# Patient Record
Sex: FEMALE | Race: WHITE
[De-identification: names, ages, dates, MRNs, and addresses within clinical notes are randomized per-mention and may not be internally consistent; named-entity substitution may affect disease eponyms.]

---

## 2019-09-20 ENCOUNTER — APPOINTMENT (OUTPATIENT)
Dept: CT IMAGING | Facility: CLINIC | Age: 39
End: 2019-09-20
Payer: COMMERCIAL

## 2019-09-20 ENCOUNTER — APPOINTMENT (OUTPATIENT)
Dept: CT IMAGING | Facility: CLINIC | Age: 39
End: 2019-09-20

## 2019-09-20 PROCEDURE — 71275 CT ANGIOGRAPHY CHEST: CPT

## 2019-09-20 PROCEDURE — Q9967F: CUSTOM

## 2019-09-24 PROBLEM — Z00.00 ENCOUNTER FOR PREVENTIVE HEALTH EXAMINATION: Status: ACTIVE | Noted: 2019-09-24

## 2019-10-01 ENCOUNTER — APPOINTMENT (OUTPATIENT)
Dept: CARDIOLOGY | Facility: CLINIC | Age: 39
End: 2019-10-01
Payer: COMMERCIAL

## 2019-10-01 ENCOUNTER — NON-APPOINTMENT (OUTPATIENT)
Age: 39
End: 2019-10-01

## 2019-10-01 VITALS
DIASTOLIC BLOOD PRESSURE: 74 MMHG | OXYGEN SATURATION: 98 % | BODY MASS INDEX: 25.52 KG/M2 | HEIGHT: 60 IN | HEART RATE: 75 BPM | WEIGHT: 130 LBS | SYSTOLIC BLOOD PRESSURE: 110 MMHG

## 2019-10-01 DIAGNOSIS — Z86.59 PERSONAL HISTORY OF OTHER MENTAL AND BEHAVIORAL DISORDERS: ICD-10-CM

## 2019-10-01 DIAGNOSIS — Z82.49 FAMILY HISTORY OF ISCHEMIC HEART DISEASE AND OTHER DISEASES OF THE CIRCULATORY SYSTEM: ICD-10-CM

## 2019-10-01 DIAGNOSIS — Z78.9 OTHER SPECIFIED HEALTH STATUS: ICD-10-CM

## 2019-10-01 PROCEDURE — 93000 ELECTROCARDIOGRAM COMPLETE: CPT

## 2019-10-01 PROCEDURE — 99205 OFFICE O/P NEW HI 60 MIN: CPT

## 2019-10-01 RX ORDER — ALPRAZOLAM 0.25 MG/1
0.25 TABLET ORAL
Refills: 0 | Status: ACTIVE | COMMUNITY

## 2019-10-01 NOTE — ASSESSMENT
[FreeTextEntry1] : CP/SOB:  Echocardiogram indicated for assessment of pericardium and LV fxn.  Stress echo indicated to assess for ischemia.\par \par Birth Control:  Overall have recommended d/c of all birth control.  Pt. will review w GYN who put in device.

## 2019-10-01 NOTE — HISTORY OF PRESENT ILLNESS
[FreeTextEntry1] : Several weeks ago the patient had a new birth control\par  About that time she developed dyspnea. Patient is SOB with minimal exertion. It is very unusual for her. She previously exercised without any exertional dyspnea. Almost simultaneously she developed substernal chest heaviness. Sometimes occurs with exertion. Sometimes occurs with deep breath. The patient underwent CT of the chest. It reveals prominent pulmonary artery consistent with pulmonary hypertension. There was no pulmonary embolisms. There has been no prolonged rest chest discomfort.  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

## 2019-10-01 NOTE — PHYSICAL EXAM
[General Appearance - Well Developed] : well developed [Normal Appearance] : normal appearance [Well Groomed] : well groomed [General Appearance - Well Nourished] : well nourished [No Deformities] : no deformities [General Appearance - In No Acute Distress] : no acute distress [Normal Conjunctiva] : the conjunctiva exhibited no abnormalities [Eyelids - No Xanthelasma] : the eyelids demonstrated no xanthelasmas [Normal Oral Mucosa] : normal oral mucosa [No Oral Pallor] : no oral pallor [No Oral Cyanosis] : no oral cyanosis [Normal Jugular Venous A Waves Present] : normal jugular venous A waves present [Normal Jugular Venous V Waves Present] : normal jugular venous V waves present [No Jugular Venous Mclaughlin A Waves] : no jugular venous mclaughlin A waves [Heart Rate And Rhythm] : heart rate and rhythm were normal [Heart Sounds] : normal S1 and S2 [Murmurs] : no murmurs present [Respiration, Rhythm And Depth] : normal respiratory rhythm and effort [Exaggerated Use Of Accessory Muscles For Inspiration] : no accessory muscle use [Auscultation Breath Sounds / Voice Sounds] : lungs were clear to auscultation bilaterally [Abdomen Soft] : soft [Abdomen Tenderness] : non-tender [Abdomen Mass (___ Cm)] : no abdominal mass palpated [Abnormal Walk] : normal gait [Gait - Sufficient For Exercise Testing] : the gait was sufficient for exercise testing [Nail Clubbing] : no clubbing of the fingernails [Cyanosis, Localized] : no localized cyanosis [Petechial Hemorrhages (___cm)] : no petechial hemorrhages [Skin Color & Pigmentation] : normal skin color and pigmentation [] : no rash [No Venous Stasis] : no venous stasis [Skin Lesions] : no skin lesions [No Skin Ulcers] : no skin ulcer [No Xanthoma] : no  xanthoma was observed [Oriented To Time, Place, And Person] : oriented to person, place, and time [Affect] : the affect was normal [Mood] : the mood was normal [No Anxiety] : not feeling anxious

## 2019-10-14 ENCOUNTER — APPOINTMENT (OUTPATIENT)
Dept: CARDIOLOGY | Facility: CLINIC | Age: 39
End: 2019-10-14
Payer: COMMERCIAL

## 2019-10-14 PROCEDURE — 93306 TTE W/DOPPLER COMPLETE: CPT

## 2019-10-23 ENCOUNTER — APPOINTMENT (OUTPATIENT)
Dept: CARDIOLOGY | Facility: CLINIC | Age: 39
End: 2019-10-23
Payer: COMMERCIAL

## 2019-10-23 PROCEDURE — 93015 CV STRESS TEST SUPVJ I&R: CPT

## 2019-11-13 ENCOUNTER — APPOINTMENT (OUTPATIENT)
Dept: CARDIOLOGY | Facility: CLINIC | Age: 39
End: 2019-11-13
Payer: COMMERCIAL

## 2019-11-13 VITALS
BODY MASS INDEX: 25.13 KG/M2 | WEIGHT: 128 LBS | OXYGEN SATURATION: 98 % | HEIGHT: 60 IN | SYSTOLIC BLOOD PRESSURE: 110 MMHG | HEART RATE: 72 BPM | DIASTOLIC BLOOD PRESSURE: 68 MMHG

## 2019-11-13 DIAGNOSIS — Z78.9 OTHER SPECIFIED HEALTH STATUS: ICD-10-CM

## 2019-11-13 DIAGNOSIS — Z86.59 PERSONAL HISTORY OF OTHER MENTAL AND BEHAVIORAL DISORDERS: ICD-10-CM

## 2019-11-13 DIAGNOSIS — R06.02 SHORTNESS OF BREATH: ICD-10-CM

## 2019-11-13 PROCEDURE — 99213 OFFICE O/P EST LOW 20 MIN: CPT

## 2019-11-13 NOTE — PHYSICAL EXAM
[General Appearance - Well Developed] : well developed [General Appearance - Well Nourished] : well nourished [Well Groomed] : well groomed [Normal Appearance] : normal appearance [No Deformities] : no deformities [General Appearance - In No Acute Distress] : no acute distress [Normal Conjunctiva] : the conjunctiva exhibited no abnormalities [Eyelids - No Xanthelasma] : the eyelids demonstrated no xanthelasmas [Normal Oral Mucosa] : normal oral mucosa [No Oral Cyanosis] : no oral cyanosis [No Oral Pallor] : no oral pallor [Normal Jugular Venous A Waves Present] : normal jugular venous A waves present [Normal Jugular Venous V Waves Present] : normal jugular venous V waves present [No Jugular Venous Mclaughlin A Waves] : no jugular venous mclaughlin A waves [Respiration, Rhythm And Depth] : normal respiratory rhythm and effort [Auscultation Breath Sounds / Voice Sounds] : lungs were clear to auscultation bilaterally [Exaggerated Use Of Accessory Muscles For Inspiration] : no accessory muscle use [Heart Sounds] : normal S1 and S2 [Heart Rate And Rhythm] : heart rate and rhythm were normal [Murmurs] : no murmurs present [Abdomen Soft] : soft [Abdomen Tenderness] : non-tender [Abdomen Mass (___ Cm)] : no abdominal mass palpated [Abnormal Walk] : normal gait [Gait - Sufficient For Exercise Testing] : the gait was sufficient for exercise testing [Nail Clubbing] : no clubbing of the fingernails [Petechial Hemorrhages (___cm)] : no petechial hemorrhages [Cyanosis, Localized] : no localized cyanosis [Skin Color & Pigmentation] : normal skin color and pigmentation [] : no rash [No Venous Stasis] : no venous stasis [Skin Lesions] : no skin lesions [No Skin Ulcers] : no skin ulcer [No Xanthoma] : no  xanthoma was observed [Oriented To Time, Place, And Person] : oriented to person, place, and time [Affect] : the affect was normal [Mood] : the mood was normal [No Anxiety] : not feeling anxious

## 2019-11-13 NOTE — ASSESSMENT
[FreeTextEntry1] : CP/SOB: Chest pain is a reproducible at times, offered CTA coronary testing to further evaluate for ischemia. reviewed risks and benefits of testing, patient declined any further testing at this time, may consider in the future but wishes to wait to see if symptoms decrease with the recent removal of her birth control implant. \par \par Pt. advised pulm f/u for enlarged PA.  \par \par Birth Control: Pt recently d/c implanted birth control. not on any hormonal forms of birth control, only on barrier forms\par \par F/U with our office in 6 months for routine cardiovascular care unless otherwise indicated. \par Discussed red flag symptoms, which would warrant sooner or emergent medical evaluation.\par Any questions and concerns were addressed and resolved.\par \par Sincerely,\par \par Saran Castrejon, NP-C\par Patients history, testing, and plan reviewed with supervising MD: Dr. Jose Kate\par \par

## 2019-11-13 NOTE — HISTORY OF PRESENT ILLNESS
[FreeTextEntry1] : This is a 39 year female with the below PMHx who presents to office for routine cardiovascular care she had been c/o episodes of shortness of breath only on exertion.\par There has been no recent illness or hospitalization.\par \par Several weeks ago the patient had a new implanted Nexplanon birth control (The she reports at today's visit that she had the birth control implanted removed 2 weeks prior). About the time of implantations she started to develop dyspnea. Patients reports continued episodes SOB with minimal exertion but does not interfer with ADLs. she previously exercised without any exertional dyspnea but has exersice recently secondary to MICHAUD.. She also reports wax/wane episodes of chest and back pain at rest, but no shortness of breath at rest .Sometimes the pain is reproduce with deep breath. The patient underwent CT of the chest. It reveals prominent pulmonary artery consistent with pulmonary hypertension,  ECHO finding with RV systolic pressure of 25 . There was no pulmonary embolisms on CT. There has been no prolonged rest chest discomfort.  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

## 2020-05-13 ENCOUNTER — APPOINTMENT (OUTPATIENT)
Dept: CARDIOLOGY | Facility: CLINIC | Age: 40
End: 2020-05-13

## 2021-01-05 ENCOUNTER — TRANSCRIPTION ENCOUNTER (OUTPATIENT)
Age: 41
End: 2021-01-05

## 2022-07-14 ENCOUNTER — APPOINTMENT (OUTPATIENT)
Dept: MAMMOGRAPHY | Facility: CLINIC | Age: 42
End: 2022-07-14

## 2022-07-14 PROCEDURE — 77067 SCR MAMMO BI INCL CAD: CPT

## 2022-07-14 PROCEDURE — 77063 BREAST TOMOSYNTHESIS BI: CPT

## 2022-10-07 ENCOUNTER — EMERGENCY (EMERGENCY)
Facility: HOSPITAL | Age: 42
LOS: 1 days | Discharge: HOME | End: 2022-10-07
Attending: EMERGENCY MEDICINE | Admitting: EMERGENCY MEDICINE
Payer: COMMERCIAL

## 2022-10-07 VITALS
OXYGEN SATURATION: 99 % | HEIGHT: 61 IN | DIASTOLIC BLOOD PRESSURE: 87 MMHG | WEIGHT: 139.99 LBS | HEART RATE: 98 BPM | SYSTOLIC BLOOD PRESSURE: 143 MMHG | RESPIRATION RATE: 18 BRPM | TEMPERATURE: 99 F

## 2022-10-07 PROCEDURE — 73590 X-RAY EXAM OF LOWER LEG: CPT

## 2022-10-07 PROCEDURE — 73080 X-RAY EXAM OF ELBOW: CPT

## 2022-10-07 PROCEDURE — 99285 EMERGENCY DEPT VISIT HI MDM: CPT

## 2022-10-07 PROCEDURE — 72125 CT NECK SPINE W/O DYE: CPT | Mod: MG

## 2022-10-07 PROCEDURE — 73590 X-RAY EXAM OF LOWER LEG: CPT | Mod: 26,RT

## 2022-10-07 PROCEDURE — 99284 EMERGENCY DEPT VISIT MOD MDM: CPT | Mod: 25

## 2022-10-07 PROCEDURE — 73080 X-RAY EXAM OF ELBOW: CPT | Mod: 26,RT

## 2022-10-07 PROCEDURE — 72125 CT NECK SPINE W/O DYE: CPT | Mod: 26,MG

## 2022-10-07 PROCEDURE — G1004: CPT

## 2022-10-07 PROCEDURE — 70450 CT HEAD/BRAIN W/O DYE: CPT | Mod: 26,MG

## 2022-10-07 PROCEDURE — 70450 CT HEAD/BRAIN W/O DYE: CPT | Mod: MG

## 2022-10-07 NOTE — ED PROVIDER NOTE - PHYSICAL EXAMINATION
Pos tend to R paraspinal cervical, R post occiput. No midline spinal tend (c,t,l).   Pos min tend to R elbow, FROM with no pain. FROM R shoulder with no acute pain. no edema.

## 2022-10-07 NOTE — ED PROVIDER NOTE - PROGRESS NOTE DETAILS
All results were explained to patient and a copy of all available results given. At length discussion with patient in regards to the head inj.  Discussed importance of prompt, close medical follow-up.  Discussed importance of avoiding activities where the patient would be at risk for further head injury, for a minimum of 2 weeks.  Discussed head injury precautions and instructions as well.  Patient demonstrates understanding of all instructions.  Dw pt re fall prec, gen trauma prec as well.

## 2022-10-07 NOTE — ED PROVIDER NOTE - OBJECTIVE STATEMENT
42-year-old female with no significant past medical history, currently undergoing IVF not having yet had implantation presents with status post slip and fall on a wet floor today.  Patient slipped on the floor, hitting the back of her head and right elbow on the wall.  Patient also hit the right shin.  No LOC.  Patient complains of right sided neck pain, moderate headache.  No numbness/tingling/focal weakness.  No cough/URI.  No COVID exposures.  Patient fully vaccinated for COVID.  No radiation of pain down the arm at this time.  Patient initially felt some radiation of pain from her right elbow to her right forearm, however not currently having at this time.  No other acute injury or complaints.  Occurred this afternoon.

## 2022-10-07 NOTE — ED PROVIDER NOTE - CARE PROVIDER_API CALL
Jose Chan)  Internal Medicine  [address], [address]  [place] [address]  Phone: [phone]  Fax: [phone]  Follow Up Time:

## 2022-10-07 NOTE — ED PROVIDER NOTE - NSFOLLOWUPINSTRUCTIONS_ED_ALL_ED_FT
1) Follow-up with your Primary Medical Doctor or referred doctor. Call today / next business day for prompt follow-up.  2) Return to Emergency room for any worsening or persistent pain, dizziness, difficulty walking, feeling unsteady, vomiting, visual changes, numbness, tingling, any unknown fluid coming out of nose or ears, any changes in your speech,  worsening or persistent headaches,  weakness, fever, or any other concerning symptoms.  3) See attached instruction sheets for additional information, including information regarding signs and symptoms to look out for, reasons to seek immediate care and other important instructions.  4) You should avoid any activities where you may hit your head until cleared by your doctor, at least two weeks.  5) Follow-up with Neurology, especially if your symptoms persist.  Dr Franci Islas: [phone]  [facility]): [phone]  [facility]: [phone]  [facility]: [phone]  6) Tylenol as needed for pain

## 2022-10-07 NOTE — ED PROVIDER NOTE - ENMT, MLM
Airway patent, Nasal mucosa clear. Mouth with normal mucosa. Throat has no vesicles, no oropharyngeal exudates and uvula is midline. neck supple. no meningeal signs. no ext signs of head trauma.

## 2022-10-07 NOTE — ED ADULT NURSE NOTE - OBJECTIVE STATEMENT
Patient is a 42-year-old female with no significant past medical history, currently undergoing IVF not having yet had implantation presents with status post slip and fall on a wet floor today.  Patient slipped on the floor, hitting the back of her head and right elbow on the wall.  Patient also hit the right shin.  No LOC.  Patient complains of right sided neck pain, moderate headache.  No numbness/tingling/focal weakness.  No cough/URI.  No COVID exposures.  Patient fully vaccinated for COVID.  No radiation of pain down the arm at this time.  Patient initially felt some radiation of pain from her right elbow to her right forearm, however not currently having at this time.  No other acute injury or complaints.  Occurred this afternoon.

## 2022-10-07 NOTE — ED ADULT TRIAGE NOTE - CHIEF COMPLAINT QUOTE
Patient BIBA A/Ox4 with clear speech s/p trip and fall. Says she hit the back of her head but denies LOC. Also c/o right elbow pain

## 2022-10-07 NOTE — ED PROVIDER NOTE - PATIENT PORTAL LINK FT
You can access the FollowMyHealth Patient Portal offered by [facility] by registering at the following website: [email or web address]. By joining [facility]’s FollowMyHealth portal, you will also be able to view your health information using other applications (apps) compatible with our system.

## 2022-10-07 NOTE — ED ADULT NURSE NOTE - NSIMPLEMENTINTERV_GEN_ALL_ED
Implemented All Fall Risk Interventions:  [place] to call system. Call bell, personal items and telephone within reach. Instruct patient to call for assistance. Room bathroom lighting operational. Non-slip footwear when patient is off stretcher. Physically safe environment: no spills, clutter or unnecessary equipment. Stretcher in lowest position, wheels locked, appropriate side rails in place. Provide visual cue, wrist band, yellow gown, etc. Monitor gait and stability. Monitor for mental status changes and reorient to person, place, and time. Review medications for side effects contributing to fall risk. Reinforce activity limits and safety measures with patient and family.

## 2023-08-14 ENCOUNTER — NON-APPOINTMENT (OUTPATIENT)
Age: 43
End: 2023-08-14

## 2023-08-14 ENCOUNTER — APPOINTMENT (OUTPATIENT)
Dept: OPHTHALMOLOGY | Facility: CLINIC | Age: 43
End: 2023-08-14
Payer: MEDICAID

## 2023-08-14 PROCEDURE — 99203 OFFICE O/P NEW LOW 30 MIN: CPT

## 2023-12-08 ENCOUNTER — APPOINTMENT (OUTPATIENT)
Dept: CARDIOLOGY | Facility: CLINIC | Age: 43
End: 2023-12-08
Payer: MEDICAID

## 2023-12-08 VITALS
HEIGHT: 60 IN | OXYGEN SATURATION: 99 % | HEART RATE: 73 BPM | DIASTOLIC BLOOD PRESSURE: 72 MMHG | BODY MASS INDEX: 26.31 KG/M2 | WEIGHT: 134 LBS | SYSTOLIC BLOOD PRESSURE: 122 MMHG

## 2023-12-08 VITALS — SYSTOLIC BLOOD PRESSURE: 120 MMHG | DIASTOLIC BLOOD PRESSURE: 70 MMHG

## 2023-12-08 DIAGNOSIS — Z78.9 OTHER SPECIFIED HEALTH STATUS: ICD-10-CM

## 2023-12-08 DIAGNOSIS — R07.9 CHEST PAIN, UNSPECIFIED: ICD-10-CM

## 2023-12-08 DIAGNOSIS — Z82.49 FAMILY HISTORY OF ISCHEMIC HEART DISEASE AND OTHER DISEASES OF THE CIRCULATORY SYSTEM: ICD-10-CM

## 2023-12-08 DIAGNOSIS — E78.49 OTHER HYPERLIPIDEMIA: ICD-10-CM

## 2023-12-08 PROCEDURE — 99213 OFFICE O/P EST LOW 20 MIN: CPT

## 2023-12-08 RX ORDER — SERTRALINE 25 MG/1
25 TABLET, FILM COATED ORAL DAILY
Refills: 0 | Status: DISCONTINUED | COMMUNITY
End: 2023-12-08

## 2023-12-11 PROBLEM — Z00.00 ENCOUNTER FOR PREVENTIVE HEALTH EXAMINATION: Status: ACTIVE | Noted: 2023-12-11

## 2024-02-16 ENCOUNTER — APPOINTMENT (OUTPATIENT)
Dept: CARDIOLOGY | Facility: CLINIC | Age: 44
End: 2024-02-16

## 2024-02-26 NOTE — ED PROVIDER NOTE - WR INTERPRETED BY 1
February 26, 2024    Georgette Alatorre  [address] [place] [id number]        Dear Georgette,     You are scheduled for Mohs Surgery at the 3 rd floor [facility] on:     Thursday April 11 th at 8:45 am.     Please check in at 3rd [facility], [address].     You don't need to arrive more than 5-10 minutes prior to your appointment time.     Be sure to eat a good breakfast and bathe and wash your hair prior to Surgery.    If you are taking any anti-coagulants that are prescribed by your Doctor (such as Coumadin/warfarin, Plavix, Aspirin, Ibuprofen), please continue taking them.     However, If you are taking anti-coagulants over the counter without  a Doctor's order for a Medical condition, please discontinue them 10 days prior to Surgery.      Please wear loose comfortable clothing as it could possibly be 4-6 hours until your surgery is completed depending upon how many layers of tissue need to be removed.     Sincerely,     Jhonathan Diaz/ Helena Hart RN      Your TH April 11 th 8:45 am appointment is at:     [facility] 3rd [facility] office     [address] [address]    [phone]     Sree Blair

## 2024-03-06 ENCOUNTER — APPOINTMENT (OUTPATIENT)
Dept: MAMMOGRAPHY | Facility: CLINIC | Age: 44
End: 2024-03-06
Payer: MEDICAID

## 2024-03-06 ENCOUNTER — APPOINTMENT (OUTPATIENT)
Dept: ULTRASOUND IMAGING | Facility: CLINIC | Age: 44
End: 2024-03-06

## 2024-03-06 PROCEDURE — 77067 SCR MAMMO BI INCL CAD: CPT

## 2024-03-06 PROCEDURE — 77063 BREAST TOMOSYNTHESIS BI: CPT

## 2024-03-06 PROCEDURE — 76641 ULTRASOUND BREAST COMPLETE: CPT | Mod: 50

## 2024-06-18 ENCOUNTER — TRANSCRIPTION ENCOUNTER (OUTPATIENT)
Age: 44
End: 2024-06-18

## 2024-06-19 ENCOUNTER — APPOINTMENT (OUTPATIENT)
Dept: ULTRASOUND IMAGING | Facility: CLINIC | Age: 44
End: 2024-06-19
Payer: MEDICAID

## 2024-06-19 ENCOUNTER — APPOINTMENT (OUTPATIENT)
Dept: MAMMOGRAPHY | Facility: CLINIC | Age: 44
End: 2024-06-19

## 2024-06-19 PROCEDURE — 76642 ULTRASOUND BREAST LIMITED: CPT | Mod: RT

## 2024-08-14 NOTE — ED ADULT TRIAGE NOTE - AS TEMP SITE
forehead
[FreeTextEntry1] : 78 y/o F with who was originally referred by Dr.Margaret Figueroa. She has a long-standing history of varicose veins since she was ~ age 18 and also has spider veins. She stated her veins became more prominent after her pregnancies many years ago. She was seen by MINDY Guzman 8/1/24 with reports of worsening larger, bulging veins on the left leg, bruising and concerns for clots/circulation. Duplex on 8/2, demonstrated +GSV reflux along with large varices. She presents to discuss surgical options with Dr Broussard. She has been using her compression stockings thigh-highs. Denies any fever/chills, wounds, palpable cords, SOB/CP. No leg sensory or motor deficits in the legs, or episodes of DVT/SVT. She does reports the veins hurt her on the left leg but not on the right leg.  Pt reports swelling of the GSV, and numbness of toes on left foot. Pt on low dose aspirin because of phlebitis worries. 
[FreeTextEntry1] : 78 y/o F with who was originally referred by Dr.Margaret Figueroa. She has a long-standing history of varicose veins since she was ~ age 18 and also has spider veins. She stated her veins became more prominent after her pregnancies many years ago. She was seen by MINDY Guzman 8/1/24 with reports of worsening larger, bulging veins on the left leg, bruising and concerns for clots/circulation. Duplex on 8/2, demonstrated +GSV reflux along with large varices. She presents to discuss surgical options with Dr Broussard. She has been using her compression stockings thigh-highs. Denies any fever/chills, wounds, palpable cords, SOB/CP. No leg sensory or motor deficits in the legs, or episodes of DVT/SVT. She does reports the veins hurt her on the left leg but not on the right leg.  Pt reports swelling of the GSV, and numbness of toes on left foot. Pt on low dose aspirin because of phlebitis worries. 
[FreeTextEntry1] : 80 y/o F with who was originally referred by Dr.Margaret Figueroa. She has a long-standing history of varicose veins since she was ~ age 18 and also has spider veins. She stated her veins became more prominent after her pregnancies many years ago. She was seen by MINDY Guzman 8/1/24 with reports of worsening larger, bulging veins on the left leg, bruising and concerns for clots/circulation. Duplex on 8/2, demonstrated +GSV reflux along with large varices. She presents to discuss surgical options with Dr Broussard. She has been using her compression stockings thigh-highs. Denies any fever/chills, wounds, palpable cords, SOB/CP. No leg sensory or motor deficits in the legs, or episodes of DVT/SVT. She does reports the veins hurt her on the left leg but not on the right leg.  Pt reports swelling of the GSV, and numbness of toes on left foot. Pt on low dose aspirin because of phlebitis worries.

## 2024-08-28 ENCOUNTER — APPOINTMENT (OUTPATIENT)
Dept: ULTRASOUND IMAGING | Facility: CLINIC | Age: 44
End: 2024-08-28
Payer: MEDICAID

## 2024-08-28 PROCEDURE — 76856 US EXAM PELVIC COMPLETE: CPT

## 2024-08-28 PROCEDURE — 76770 US EXAM ABDO BACK WALL COMP: CPT

## 2024-10-10 ENCOUNTER — APPOINTMENT (OUTPATIENT)
Dept: MRI IMAGING | Facility: CLINIC | Age: 44
End: 2024-10-10

## 2024-10-10 PROCEDURE — 70551 MRI BRAIN STEM W/O DYE: CPT

## 2025-01-23 ENCOUNTER — APPOINTMENT (OUTPATIENT)
Dept: RADIOLOGY | Facility: CLINIC | Age: 45
End: 2025-01-23
Payer: MEDICAID

## 2025-01-23 PROCEDURE — 71046 X-RAY EXAM CHEST 2 VIEWS: CPT

## 2025-06-13 ENCOUNTER — APPOINTMENT (OUTPATIENT)
Dept: CARDIOLOGY | Facility: CLINIC | Age: 45
End: 2025-06-13
Payer: MEDICAID

## 2025-06-13 VITALS
WEIGHT: 119 LBS | BODY MASS INDEX: 23.36 KG/M2 | SYSTOLIC BLOOD PRESSURE: 114 MMHG | RESPIRATION RATE: 12 BRPM | DIASTOLIC BLOOD PRESSURE: 80 MMHG | OXYGEN SATURATION: 97 % | HEART RATE: 70 BPM | HEIGHT: 60 IN

## 2025-06-13 PROCEDURE — 99204 OFFICE O/P NEW MOD 45 MIN: CPT

## 2025-06-13 PROCEDURE — 93000 ELECTROCARDIOGRAM COMPLETE: CPT

## 2025-06-24 ENCOUNTER — APPOINTMENT (OUTPATIENT)
Dept: CARDIOLOGY | Facility: CLINIC | Age: 45
End: 2025-06-24

## 2025-07-11 ENCOUNTER — APPOINTMENT (OUTPATIENT)
Dept: CARDIOLOGY | Facility: CLINIC | Age: 45
End: 2025-07-11